# Patient Record
Sex: MALE | Race: OTHER | Employment: FULL TIME | URBAN - METROPOLITAN AREA
[De-identification: names, ages, dates, MRNs, and addresses within clinical notes are randomized per-mention and may not be internally consistent; named-entity substitution may affect disease eponyms.]

---

## 2017-04-12 ENCOUNTER — TELEPHONE (OUTPATIENT)
Dept: INTERNAL MEDICINE CLINIC | Facility: CLINIC | Age: 32
End: 2017-04-12

## 2017-04-12 NOTE — TELEPHONE ENCOUNTER
Pt is due for [facility]'S PSYCHIATRIC [employer] [employer] wellness exam anytime this calendar year. Left message to call back.

## 2017-05-09 ENCOUNTER — TELEPHONE (OUTPATIENT)
Dept: INTERNAL MEDICINE CLINIC | Facility: CLINIC | Age: 32
End: 2017-05-09

## 2017-05-09 NOTE — TELEPHONE ENCOUNTER
Pt is due for [facility] PSYCHIATRIC [employer] wellness exam anytime this calendar year. Left message to call back [phone].

## 2017-07-17 ENCOUNTER — TELEPHONE (OUTPATIENT)
Dept: FAMILY MEDICINE CLINIC | Facility: CLINIC | Age: 32
End: 2017-07-17

## 2017-07-17 NOTE — TELEPHONE ENCOUNTER
Pt is due for [facility]'S PSYCHIATRIC [place] Prec[employer] wellness exam anytime this calendar year. Left message to call back [phone].

## 2017-08-19 ENCOUNTER — OFFICE VISIT (OUTPATIENT)
Dept: FAMILY MEDICINE CLINIC | Facility: CLINIC | Age: 32
End: 2017-08-19

## 2017-08-19 ENCOUNTER — HOSPITAL ENCOUNTER (OUTPATIENT)
Dept: GENERAL RADIOLOGY | Age: 32
Discharge: HOME | End: 2017-08-19
Attending: FAMILY MEDICINE
Payer: COMMERCIAL

## 2017-08-19 VITALS
WEIGHT: 171 LBS | HEART RATE: 92 BPM | DIASTOLIC BLOOD PRESSURE: 85 MMHG | SYSTOLIC BLOOD PRESSURE: 134 MMHG | BODY MASS INDEX: 26 KG/M2

## 2017-08-19 DIAGNOSIS — M25.562 LEFT KNEE PAIN, UNSPECIFIED CHRONICITY: Primary | ICD-10-CM

## 2017-08-19 DIAGNOSIS — M25.562 LEFT KNEE PAIN, UNSPECIFIED CHRONICITY: ICD-10-CM

## 2017-08-19 PROCEDURE — 73562 X-RAY EXAM OF KNEE 3: CPT | Performed by: FAMILY MEDICINE

## 2017-08-19 PROCEDURE — 99212 OFFICE O/P EST SF 10 MIN: CPT | Performed by: FAMILY MEDICINE

## 2017-08-19 PROCEDURE — 99213 OFFICE O/P EST LOW 20 MIN: CPT | Performed by: FAMILY MEDICINE

## 2017-08-19 NOTE — PROGRESS NOTES
Blood pressure 134/85, pulse 92, weight 171 lb (77.6 kg). Patient presents today complaining of left knee pain for over a year. Reports he was kicked during a soccer game he has chronic pain there.   He reports that it does get stiff after sitting in a

## 2017-11-14 ENCOUNTER — OFFICE VISIT (OUTPATIENT)
Dept: FAMILY MEDICINE CLINIC | Facility: CLINIC | Age: 32
End: 2017-11-14

## 2017-11-14 VITALS
TEMPERATURE: 98 F | WEIGHT: 183 LBS | HEART RATE: 71 BPM | SYSTOLIC BLOOD PRESSURE: 126 MMHG | OXYGEN SATURATION: 98 % | RESPIRATION RATE: 14 BRPM | DIASTOLIC BLOOD PRESSURE: 80 MMHG | BODY MASS INDEX: 29.41 KG/M2 | HEIGHT: 66 IN

## 2017-11-14 DIAGNOSIS — J01.40 ACUTE PANSINUSITIS, RECURRENCE NOT SPECIFIED: Primary | ICD-10-CM

## 2017-11-14 PROCEDURE — 99202 OFFICE O/P NEW SF 15 MIN: CPT | Performed by: NURSE PRACTITIONER

## 2017-11-14 RX ORDER — AMOXICILLIN AND CLAVULANATE POTASSIUM 875; 125 MG/1; MG/1
1 TABLET, FILM COATED ORAL 2 TIMES DAILY
Qty: 20 TABLET | Refills: 0 | Status: SHIPPED | OUTPATIENT
Start: 2017-11-14 | End: 2017-11-24

## 2017-11-15 NOTE — PROGRESS NOTES
CHIEF COMPLAINT:   Patient presents with:  Nasal Congestion: cold symptoms for 2 weeks, now has sinus pressure. HPI:   Brenda No is a 34year old male who presents for sinus congestion for  2  weeks.  Symptoms have been worsening since onse THROAT: oral mucosa pink, moist. No visible dental caries. Posterior pharynx is not erythematous. + PND. NECK: supple, non-tender  LUNGS: clear to auscultation bilaterally, no wheezes or rhonchi, no diminished breath sounds. Breathing is non labored.   CA · Probiotics: Capsule/granule forms such as Florastor, Florajen (refrigerated), or Culturelle. Take the probiotic at least 2 -3 hours after taking the antibiotic.     Monitor symptoms and return to clinic or follow-up with PCP if not better in 2-3 days · An expectorant containing guaifenesin may help thin the mucus and promote drainage from the sinuses. · Over-the-counter decongestants may be used unless a similar medicine was prescribed.  Nasal sprays work the fastest. Use one that contains phenylephrin © 5359-0397 [employer] [address] [facility], [facility] [place]. All rights reserved. This information is not intended as a substitute for professional medical care. Always follow your healthcare professional's instructions.             The

## 2018-02-26 ENCOUNTER — TELEPHONE (OUTPATIENT)
Dept: FAMILY MEDICINE CLINIC | Facility: CLINIC | Age: 33
End: 2018-02-26

## 2018-02-26 DIAGNOSIS — M25.569 CHRONIC KNEE PAIN, UNSPECIFIED LATERALITY: Primary | ICD-10-CM

## 2018-02-26 DIAGNOSIS — G89.29 CHRONIC KNEE PAIN, UNSPECIFIED LATERALITY: Primary | ICD-10-CM

## 2018-02-26 NOTE — TELEPHONE ENCOUNTER
Pt's wife calling to request a new order/referral for PT placed on Pt's chart. PT office informed wife that on their end it needs to be issued at least 60 days prior to Pt scheduling appts and the current referral was written in August of 2017.  Please call

## 2019-02-11 ENCOUNTER — NURSE TRIAGE (OUTPATIENT)
Dept: OTHER | Age: 34
End: 2019-02-11

## 2019-02-11 NOTE — TELEPHONE ENCOUNTER
Action Requested: Summary for Provider     []  Critical Lab, Recommendations Needed  [] Need Additional Advice  []   FYI    []   Need Orders  [] Need Medications Sent to Pharmacy  []  Other     SUMMARY: Appointment made with Dr Alli Onofre on 2/12/19 at 4:5

## 2019-02-12 ENCOUNTER — OFFICE VISIT (OUTPATIENT)
Dept: FAMILY MEDICINE CLINIC | Facility: CLINIC | Age: 34
End: 2019-02-12
Payer: COMMERCIAL

## 2019-02-12 VITALS
SYSTOLIC BLOOD PRESSURE: 118 MMHG | HEIGHT: 66 IN | DIASTOLIC BLOOD PRESSURE: 69 MMHG | HEART RATE: 72 BPM | WEIGHT: 185 LBS | BODY MASS INDEX: 29.73 KG/M2

## 2019-02-12 DIAGNOSIS — J01.90 ACUTE NON-RECURRENT SINUSITIS, UNSPECIFIED LOCATION: Primary | ICD-10-CM

## 2019-02-12 PROCEDURE — 99213 OFFICE O/P EST LOW 20 MIN: CPT | Performed by: FAMILY MEDICINE

## 2019-02-12 PROCEDURE — 99212 OFFICE O/P EST SF 10 MIN: CPT | Performed by: FAMILY MEDICINE

## 2019-02-12 RX ORDER — AMOXICILLIN AND CLAVULANATE POTASSIUM 875; 125 MG/1; MG/1
1 TABLET, FILM COATED ORAL 2 TIMES DAILY
Qty: 20 TABLET | Refills: 0 | Status: SHIPPED | OUTPATIENT
Start: 2019-02-12 | End: 2019-02-15

## 2019-02-12 NOTE — PATIENT INSTRUCTIONS
Afrin nasal spray 4 days only     VAPORIZER IN BEDROOM    NYQUIL AT NIGHT     IBUPROFEN FOR HEADACHES

## 2019-02-12 NOTE — PROGRESS NOTES
Blood pressure 118/69, pulse 72, height 5' 6\" (1.676 m), weight 185 lb (83.9 kg). 3-week history coughing with yellow to green phlegm some fever some chills and a headache with facial pressure.   Clear nasal discharge coughing up yellow to green phlegm

## 2019-02-14 ENCOUNTER — NURSE TRIAGE (OUTPATIENT)
Dept: OTHER | Age: 34
End: 2019-02-14

## 2019-02-14 NOTE — TELEPHONE ENCOUNTER
Pt called to follow up on message. Advised that we are still awaiting a response from Laura Sweeney. Inquired again about symptoms. Hives are unimproved and his throat feels tight. Recommended immediate treatment in IC or ER. Benadryl po now if he has some.   He

## 2019-02-14 NOTE — TELEPHONE ENCOUNTER
Action Requested: Summary for Provider     []  Critical Lab, Recommendations Needed  [] Need Additional Advice  []   FYI    []   Need Orders  [] Need Medications Sent to Pharmacy  []  Other     SUMMARY:  [facility] PSYCHIATRIC SUPPORT [place]: please advise.     Reason for call: Hives (hiv

## 2019-02-15 ENCOUNTER — HOSPITAL ENCOUNTER (OUTPATIENT)
Age: 34
Discharge: HOME | End: 2019-02-15
Payer: COMMERCIAL

## 2019-02-15 VITALS
OXYGEN SATURATION: 100 % | TEMPERATURE: 99 F | RESPIRATION RATE: 18 BRPM | HEART RATE: 87 BPM | DIASTOLIC BLOOD PRESSURE: 79 MMHG | SYSTOLIC BLOOD PRESSURE: 130 MMHG

## 2019-02-15 DIAGNOSIS — T78.40XA ALLERGIC REACTION, INITIAL ENCOUNTER: Primary | ICD-10-CM

## 2019-02-15 PROCEDURE — 96374 THER/PROPH/DIAG INJ IV PUSH: CPT

## 2019-02-15 PROCEDURE — 96375 TX/PRO/DX INJ NEW DRUG ADDON: CPT

## 2019-02-15 PROCEDURE — S0028 INJECTION, FAMOTIDINE, 20 MG: HCPCS

## 2019-02-15 PROCEDURE — 99204 OFFICE O/P NEW MOD 45 MIN: CPT

## 2019-02-15 PROCEDURE — 99214 OFFICE O/P EST MOD 30 MIN: CPT

## 2019-02-15 RX ORDER — SODIUM CHLORIDE 9 MG/ML
1000 INJECTION, SOLUTION INTRAVENOUS ONCE
Status: COMPLETED | OUTPATIENT
Start: 2019-02-15 | End: 2019-02-15

## 2019-02-15 RX ORDER — METHYLPREDNISOLONE SODIUM SUCCINATE 125 MG/2ML
125 INJECTION, POWDER, LYOPHILIZED, FOR SOLUTION INTRAMUSCULAR; INTRAVENOUS ONCE
Status: COMPLETED | OUTPATIENT
Start: 2019-02-15 | End: 2019-02-15

## 2019-02-15 RX ORDER — FAMOTIDINE 20 MG/1
20 TABLET ORAL 2 TIMES DAILY PRN
Qty: 30 TABLET | Refills: 0 | Status: SHIPPED | OUTPATIENT
Start: 2019-02-15 | End: 2019-02-18 | Stop reason: ALTCHOICE

## 2019-02-15 RX ORDER — DIPHENHYDRAMINE HCL 25 MG
50 TABLET ORAL EVERY 6 HOURS PRN
Qty: 30 TABLET | Refills: 0 | Status: SHIPPED | OUTPATIENT
Start: 2019-02-15 | End: 2019-02-18

## 2019-02-15 RX ORDER — PREDNISONE 20 MG/1
40 TABLET ORAL DAILY
Qty: 10 TABLET | Refills: 0 | Status: SHIPPED | OUTPATIENT
Start: 2019-02-15 | End: 2019-02-18 | Stop reason: ALTCHOICE

## 2019-02-15 RX ORDER — FAMOTIDINE 10 MG/ML
20 INJECTION, SOLUTION INTRAVENOUS ONCE
Status: COMPLETED | OUTPATIENT
Start: 2019-02-15 | End: 2019-02-15

## 2019-02-15 NOTE — TELEPHONE ENCOUNTER
Patient's wife calling back stated no one called back yesterday. The patient continues to have swelling. When he woke up today his eyes, lips and tongue are swollen with itching. He denies any difficulty breathing.    The wife stated they are going

## 2019-02-15 NOTE — ED INITIAL ASSESSMENT (HPI)
Hives started Tuesday, on augmentin for sinus infection, here for throat swelling
Detail Level: Detailed
Detail Level: Generalized

## 2019-02-15 NOTE — TELEPHONE ENCOUNTER
Patient was seen in the UC on 2/14/19. Dr. Roberto Winter please advise on the antibiotic. Thank you.

## 2019-02-15 NOTE — ED PROVIDER NOTES
No chief complaint on file. HPI:     Devin Menon is a 28year old male with no significant past medical history presents with a chief complaint of  hives, shortness of breath and pruritis. Pt states that he was started on Augmentin on Tuesday.   Rosanne Saez care provider. Lungs continue to be clear bilaterally. Patient encouraged to stop taking the Augmentin. Will give prescription for prednisone, Pepcid as well as encouraged to take Benadryl every 6 hours.   Any worsening symptoms he should immediately go

## 2019-02-16 NOTE — TELEPHONE ENCOUNTER
Pt states he was seen in UC as advised, pt was given prescription for steroids, pt also taking Benadryl and Pepcid as advised. Pt states he is still itching but denies any difficulty breathing.  Advised pt call 911 or go to ER if he experiences any breathin

## 2019-02-17 ENCOUNTER — HOSPITAL ENCOUNTER (EMERGENCY)
Facility: HOSPITAL | Age: 34
Discharge: HOME | End: 2019-02-17
Attending: EMERGENCY MEDICINE
Payer: COMMERCIAL

## 2019-02-17 VITALS
HEIGHT: 68 IN | OXYGEN SATURATION: 96 % | TEMPERATURE: 97 F | BODY MASS INDEX: 28.04 KG/M2 | DIASTOLIC BLOOD PRESSURE: 82 MMHG | RESPIRATION RATE: 18 BRPM | SYSTOLIC BLOOD PRESSURE: 120 MMHG | WEIGHT: 185 LBS | HEART RATE: 75 BPM

## 2019-02-17 DIAGNOSIS — T78.40XD ALLERGIC REACTION, SUBSEQUENT ENCOUNTER: Primary | ICD-10-CM

## 2019-02-17 PROCEDURE — 99284 EMERGENCY DEPT VISIT MOD MDM: CPT

## 2019-02-17 PROCEDURE — 96374 THER/PROPH/DIAG INJ IV PUSH: CPT

## 2019-02-17 PROCEDURE — 96372 THER/PROPH/DIAG INJ SC/IM: CPT

## 2019-02-17 PROCEDURE — 96376 TX/PRO/DX INJ SAME DRUG ADON: CPT

## 2019-02-17 RX ORDER — HYDROXYZINE HYDROCHLORIDE 50 MG/ML
50 INJECTION, SOLUTION INTRAMUSCULAR ONCE
Status: COMPLETED | OUTPATIENT
Start: 2019-02-17 | End: 2019-02-17

## 2019-02-17 RX ORDER — METHYLPREDNISOLONE SODIUM SUCCINATE 125 MG/2ML
125 INJECTION, POWDER, LYOPHILIZED, FOR SOLUTION INTRAMUSCULAR; INTRAVENOUS ONCE
Status: COMPLETED | OUTPATIENT
Start: 2019-02-17 | End: 2019-02-17

## 2019-02-17 RX ORDER — METHYLPREDNISOLONE 4 MG/1
TABLET ORAL
Qty: 1 PACKAGE | Refills: 0 | Status: SHIPPED | OUTPATIENT
Start: 2019-02-17 | End: 2019-02-22

## 2019-02-17 RX ORDER — HYDROXYZINE HYDROCHLORIDE 25 MG/1
50 TABLET, FILM COATED ORAL EVERY 4 HOURS PRN
Qty: 20 TABLET | Refills: 0 | Status: SHIPPED | OUTPATIENT
Start: 2019-02-17 | End: 2019-02-18 | Stop reason: ALTCHOICE

## 2019-02-17 NOTE — ED INITIAL ASSESSMENT (HPI)
Pt states possibly having an allergic reaction to either pepcid or prednisone, reports lower lip swelling and throat feels scratchy. No respiratory distress, airway intact. Took benadryl 0800, no relief in symptoms.

## 2019-02-17 NOTE — ED PROVIDER NOTES
Patient Seen in: [facility] AND [facility] Emergency Department    History   Patient presents with:   Allergic Rxn Allergies (immune)    Stated Complaint: Allergic rxn    HPI    65-year-old male with no significant past medical history presents to the ER for eval Neck supple. No tracheal deviation present. CV: s1s2+, RRR, no m/r/g, normal distal pulses  Pulmonary/Chest: CTA b/l with no rales, wheezes. No chest wall tenderness  Abdominal: Nontender. Nondistended. Soft. Bowel sounds are normal.   Back:   :    Mu

## 2019-02-18 ENCOUNTER — OFFICE VISIT (OUTPATIENT)
Dept: FAMILY MEDICINE CLINIC | Facility: CLINIC | Age: 34
End: 2019-02-18
Payer: COMMERCIAL

## 2019-02-18 VITALS
SYSTOLIC BLOOD PRESSURE: 119 MMHG | TEMPERATURE: 98 F | HEART RATE: 74 BPM | WEIGHT: 181 LBS | BODY MASS INDEX: 28 KG/M2 | DIASTOLIC BLOOD PRESSURE: 72 MMHG

## 2019-02-18 DIAGNOSIS — J01.00 ACUTE MAXILLARY SINUSITIS, RECURRENCE NOT SPECIFIED: ICD-10-CM

## 2019-02-18 DIAGNOSIS — Z88.1 ALLERGIC REACTION TO AMOXICILLIN/CLAVULANIC ACID: Primary | ICD-10-CM

## 2019-02-18 PROCEDURE — 99212 OFFICE O/P EST SF 10 MIN: CPT | Performed by: FAMILY MEDICINE

## 2019-02-18 PROCEDURE — 99213 OFFICE O/P EST LOW 20 MIN: CPT | Performed by: FAMILY MEDICINE

## 2019-02-18 NOTE — PROGRESS NOTES
Had augmenin and the very day had rash and itching  Then took the augmentin the then next 3 doses and the itchy got worse  Then called and and had bad rash. Got prednisone and then when he went tot he house. Then Sunday had swelling in lips and tongue.

## 2022-05-20 ENCOUNTER — HOSPITAL ENCOUNTER (OUTPATIENT)
Age: 37
Discharge: HOME | End: 2022-05-20
Payer: COMMERCIAL

## 2022-05-20 VITALS
RESPIRATION RATE: 18 BRPM | TEMPERATURE: 98 F | DIASTOLIC BLOOD PRESSURE: 83 MMHG | OXYGEN SATURATION: 95 % | HEART RATE: 80 BPM | SYSTOLIC BLOOD PRESSURE: 118 MMHG

## 2022-05-20 DIAGNOSIS — L72.9 INFECTED CYST OF SKIN: Primary | ICD-10-CM

## 2022-05-20 DIAGNOSIS — L08.9 INFECTED CYST OF SKIN: Primary | ICD-10-CM

## 2022-05-20 RX ORDER — SULFAMETHOXAZOLE AND TRIMETHOPRIM 800; 160 MG/1; MG/1
1 TABLET ORAL 2 TIMES DAILY
Qty: 14 TABLET | Refills: 0 | Status: SHIPPED | OUTPATIENT
Start: 2022-05-20 | End: 2022-05-27

## 2022-05-21 NOTE — ED INITIAL ASSESSMENT (HPI)
Pt here w c/o R arm bump that is painful, itchy and throbbing x 30 min after feeling something pop. Pt states had small bump to area for months but was not painful. Denies any injuries.

## 2022-05-21 NOTE — ED PROVIDER NOTES
No chief complaint on file. HPI:     Dominguez Greenfield is a 39year old male who presents with a possible skin infection to the left posterior upper arm. Patient states he noticed a bump there for the past few weeks. He states today the bump became painful. No drainage or bleeding. There is redness. No fevers. No injury or trauma. No history of diabetes. No history of abscesses in the past.  He appears well and nontoxic. [facility] asessment screens reviewed and agree. Nursing note reviewed and I agree with documentation. Family history reviewed with patient/caregiver and is not pertinent to presenting problem. Social History    Socioeconomic History      Marital status: Single      Spouse name: Not on file      Number of children: 1      Years of education: Not on file      Highest education level: Not on file    Occupational History      Occupation: Warehouse mgr    Tobacco Use      Smoking status: Never Smoker      Smokeless tobacco: Never Used    Substance and Sexual Activity      Alcohol use: No      Drug use: No      Sexual activity: Not on file    Other Topics      Concerns:        Not on file    Social History Narrative      Not on file    Social Determinants of Health  Financial Resource Strain: Not on file  Food Insecurity: Not on file  Transportation Needs: Not on file  Physical Activity: Not on file  Stress: Not on file  Social Connections: Not on file  Intimate Partner Violence: Not on file  Housing Stability: Not on file    ROS:   Positive for stated complaint: abscess, redness, swelling, pain  All other systems reviewed and negative except as noted above. Constitutional and Vital Signs Reviewed.       Physical Exam:     GENERAL: well developed, well nourished, well hydrated, in no apparent distress  EYES: sclera clear and non icteric bilaterally  HEENT: atraumatic, normocephalic, ears and throat are clear  NECK: supple, no adenopathy  LUNGS: clear to auscultation, no RRW  CARDIO: RRR without murmur  EXTREMITIES: CMS intact. ETIENNE without difficulty. No bony tenderness RUE. SKIN: Fluctuant abscess the size of a dime to the right posterior upper arm. Redness and pain with palpation. No pus drainage or bleeding. No surrounding cellulitis. No induration. NEURO: CMS intact RUE. MDM/Assessment/Plan:     Procedure Note:  The area was cleaned. 2 cc of 1% lidocaine were used to anesthetize the area. A small incision was made with an 11 blade. A large amount of thick white pus was drained consistent with an infected sebaceous cyst.  The area was expressed and irrigated. A dressing was applied. Orders for this encounter:    Orders Placed This Encounter      sulfamethoxazole-trimethoprim -160 MG Oral Tab per tablet          Sig: Take 1 tablet by mouth 2 (two) times daily for 7 days. Dispense:  14 tablet          Refill:  0      Labs performed this visit:  No results found for this or any previous visit (from the past 10 hour(s)). MDM:  Oh placed the patient on Bactrim, recommend warm compresses, and recommend he follow-up with his doctor for wound check in the next couple days. Diagnosis:    ICD-10-CM    1. Infected cyst of skin  L72.9     L08.9        All results reviewed and discussed with patient. See AVS for detailed discharge instructions for your condition today.     Follow Up with:  Sherwin Rodriguez DO  [address] [address]  [phone]    Schedule an appointment as soon as possible for a visit in 3 days

## 2025-05-12 ENCOUNTER — TELEPHONE (OUTPATIENT)
Dept: OTOLARYNGOLOGY | Facility: CLINIC | Age: 40
End: 2025-05-12

## 2025-05-12 NOTE — ED INITIAL ASSESSMENT (HPI)
Patient reports sore throat for the last 10 days with worsening symptoms overnight.  + painful swallow, tactile fevers.

## 2025-05-12 NOTE — TELEPHONE ENCOUNTER
Per Dr. Ponce to add patient tomorrow. Pt added at 0800am at . Pt will call back regarding scheduling information, please route callback to RN.

## 2025-05-12 NOTE — ED PROVIDER NOTES
Patient Seen in: [facility]      History     Chief Complaint   Patient presents with    Sore Throat     Stated Complaint: Sore Throat    Subjective:   HPI    39 year male presents to the  with c/o sore throat x 10 days, worse x3 days.  He reports painful swallowing and worsening swelling to the right side now with swelling of the right cheek.  History of Present Illness               Objective:     History reviewed. No pertinent past medical history.           Past Surgical History:   Procedure Laterality Date    Ligation of hemorrhoid(s)  9/16/14    Other surgical history      NECK SURGERY                No pertinent social history.            Review of Systems    Positive for stated complaint: Sore Throat  Other systems are as noted in HPI.  Constitutional and vital signs reviewed.      All other systems reviewed and negative except as noted above.                  Physical Exam     ED Triage Vitals [05/12/25 0924]   /78   Pulse 68   Resp 16   Temp 98.4 °F (36.9 °C)   Temp src Oral   SpO2 99 %   O2 Device None (Room air)       Current Vitals:   Vital Signs  BP: 136/78  Pulse: 68  Resp: 16  Temp: 98.4 °F (36.9 °C)  Temp src: Oral    Oxygen Therapy  SpO2: 99 %  O2 Device: None (Room air)        Physical Exam  Vitals and nursing note reviewed.   Constitutional:       General: He is not in acute distress.     Appearance: He is well-developed. He is ill-appearing. He is not toxic-appearing.   HENT:      Head:      Jaw: Trismus and pain on movement present. No malocclusion.      Right Ear: Tympanic membrane normal.      Left Ear: Tympanic membrane normal.      Mouth/Throat:      Pharynx: Posterior oropharyngeal erythema present.      Tonsils: Tonsillar abscess present. 3+ on the right. 1+ on the left.   Cardiovascular:      Rate and Rhythm: Normal rate and regular rhythm.   Pulmonary:      Effort: Pulmonary effort is normal.   Neurological:      Mental Status: He is alert.           Physical Exam                 ED Course     Labs Reviewed   POCT CBC - Abnormal; Notable for the following components:       Result Value    # Neutrophil 8.5 (*)     All other components within normal limits   RAPID STREP A - Abnormal; Notable for the following components:    Strep A by PCR Positive (*)     All other components within normal limits   POCT ISTAT CHEM8 CARTRIDGE - Normal          Results                           MDM      Viral sore throat, strep, cellulitis considered abscess retropharyngeal abscess  Tonsillitis with enhancing +R PTA  Basic lab work unremarkable  IV clinda IV decadron fluids patient feeling significantly better  Consult Rafal oPnce ENT-will follow-up with the patient in the morning in the [place] office          Medical Decision Making  Problems Addressed:  Acute bacterial tonsillitis: acute illness or injury  Peritonsillar abscess: acute illness or injury    Amount and/or Complexity of Data Reviewed  Labs: ordered. Decision-making details documented in ED Course.  Radiology: ordered. Decision-making details documented in ED Course.  Discussion of management or test interpretation with external provider(s): ENT- Dr Ponce    Risk  OTC drugs.  Prescription drug management.        Disposition and Plan     Clinical Impression:  1. Acute bacterial tonsillitis    2. Peritonsillar abscess         Disposition:  Discharge  5/12/2025 12:08 pm    Follow-up:  Rafal Ponce,   [address] [facility] [address] [phone]    Go to   9am          Medications Prescribed:  Current Discharge Medication List        START taking these medications    Details   predniSONE 20 MG Oral Tab Take 2 tablets (40 mg total) by mouth daily for 3 days.  Qty: 6 tablet, Refills: 0      clindamycin 300 MG Oral Cap Take 1 capsule (300 mg total) by mouth 3 (three) times daily for 10 days.  Qty: 30 capsule, Refills: 0             Supplementary Documentation:

## 2025-05-12 NOTE — DISCHARGE INSTRUCTIONS
Push fluids  Tylenol/ibuprofen as needed for pain and fever  Follow-up with ENT tomorrow morning-Dr. Ponce-9 AM the office will be contacting you or call the office in the morning if needed return for concerns

## 2025-05-13 NOTE — PROGRESS NOTES
[place]  OTOLARYNGOLOGY - HEAD & NECK SURGERY    5/13/2025     Reason for Consultation:     Chief Complaint   Patient presents with    New Patient    Throat Problem     Patient here for sore throat f/up after ER on 05/12/2025, throat abscess.          History of Present Illness:     History of Present Illness  Claudy Lay is a 39 year old male who presents with a painful sore throat and swelling of the right tonsil. He was referred by a Medicare facility for further evaluation and treatment.    Approximately three to four weeks ago, he developed a sore throat which he initially managed with ibuprofen. The pain persisted and intensified over the last two weeks, becoming significantly painful a few days ago. He experiences associated pain in the jaw, teeth, and ear. He noticed significant swelling of the right tonsil.    He sought care at a Medicare facility where he received intravenous treatment. A CT scan performed at the facility showed an abscess collection in the right peritonsillar space    He reports improvement in symptoms since yesterday but continues to experience pain and difficulty opening his jaw.    He has a known allergy to penicillin, discovered approximately eight years ago when he developed a rash and throat swelling after taking Augmentin.    Past Medical History  Past Medical History[1]    Past Surgical History  Past Surgical History[2]    Family History  Family History[3]    Social History  Pediatric History   Patient Parents    Not on file     Other Topics Concern    Not on file   Social History Narrative    Not on file           Current Medications:  Current Medications[4]    Allergies  Allergies[5]    Review of Systems:   A comprehensive 10 point review of systems was completed.  Pertinent positives and negatives noted in the the HPI.    Physical Exam:   Height 5' 8\" (1.727 m), weight 179 lb 12.8 oz (81.6 kg).    GENERAL: No acute distress, Comfortable appearing  FACE: HB  1/6, Normal Animation  HEAD: Normocephalic  EYES: EOMI, pupils equil  EARS: Bilateral Auricles Symmetric   NOSE: Nares patent bilaterally  ORAL CAVITY: Tongue mobile, Oropharynx clear, with fullness of the right soft palate, slight medialization of the right tonsil    Procedure: Peritonsillar abscess drainage  After obtaining informed verbal consent the patient was anesthetized using topical 4% lidocaine.  Additional anesthetic was placed with 1% lidocaine with 1-100,000 epinephrine injection into the right soft palate.  After adequate time an 18-gauge needle was used to aspirate 2 cc of purulent fluid from the right peritonsillar space.  Patient tolerated this well and hemostasis was excellent after the drainage.    Results:     Laboratory Data:  Lab Results   Component Value Date    WBC 6.2 09/06/2014    HGB 11.6 (L) 09/06/2014    HCT 36.2 (L) 09/06/2014     09/06/2014    GLU 85 06/14/2015    AST 19 09/06/2014    ALT 19 09/06/2014         Imaging:  CT SOFT TISSUE OF NECK(CONTRAST ONLY) (CPT=70491)  Result Date: 5/12/2025  PROCEDURE: CT SOFT TISSUE OF NECK (CONTRAST ONLY) (CPT=70491)  COMPARISON: None.  INDICATIONS: Sore Throat, positive for strep throat  TECHNIQUE: Multidetector CT images were obtained through the neck soft tissues following the infusion of non-ionic intravenous contrast material. Automated exposure control for dose reduction was used. Adjustment of the mA and/or kV was done based on the  patient's size. Iterative reconstruction technique for dose reduction was employed. Multiplanar reformats were created.   FINDINGS:  NASO/OROPHARYNX: Enlargement of the right greater than left palatine tonsils with associated striated enhancement and bilateral palatine tonsilliths.  At the periphery of the right palatine tonsillar pillar, there is a 1.9 x 1.4 x 2.0 cm peripherally enhancing collection, which is concerning for intra tonsillar/peritonsillar abscess.  Tonsillar enlargement results in mild  narrowing of the oropharyngeal airway.  Additional mild likely reactive adenoid and lingual tonsillar enlargement.  Asymmetric effacement of the right parapharyngeal fat plane. ORAL CAVITY/TONGUE: No visible mass or significant asymmetry.  COMPARTMENTS: The , parotid, carotid, retropharyngeal, and perivertebral spaces are without focal attenuation abnormality. The parapharyngeal fat planes are bilaterally symmetric without effacement. HYPOPHARYNX: No mass or other visible lesion.  LARYNX: No apparent vocal cord mass or asymmetry. NECK GLANDS: The parotid, submandibular, and thyroid glands are unremarkable.  LYMPH NODES: Conspicuously enhancing but nonenlarged right greater than left cervical lymph nodes. VASCULATURE: Limited views are unremarkable.  SINUSES/MASTOIDS: Minor scattered paranasal sinus mucosal thickening with right greater than left maxillary sinus retention cysts. MEDIASTINUM: Limited views are unremarkable.  BONES: No significant osseous lesions.  OTHER: Visualized portions of the contrast-enhanced brain are grossly unremarkable.         CONCLUSION:  1. Enlargement of the right greater than left palatine tonsils with associated striated enhancement; findings are compatible with acute tonsillitis.  At the periphery of the right palatine tonsil, there is a 1.9 x 1.4 x 2.0 cm peripherally enhancing collection, which is concerning for intratonsillar/peritonsillar abscess.  Tonsillar enlargement results in mild narrowing of the oropharyngeal airway. 2. Conspicuously enhancing but nonenlarged right greater than left cervical lymph nodes, which are likely reactive. 3. Right greater left maxillary sinus retention cysts. 4. Lesser incidental findings as above.   elm-remote  Dictated by (CST): Franco Sandra MD on 5/12/2025 at 11:26 AM     Finalized by (CST): Franco Sandra MD on 5/12/2025 at 11:31 AM            Impression:       ICD-10-CM    1. Peritonsillar abscess  J36       2. Throat pain  R07.0        3. Right ear pain  H92.01           Recommendations:     Peritonsillar abscess  Right-sided peritonsillar abscess with associated jaw, teeth, and ear pain. Symptoms began three to four weeks ago with a sore throat, worsening over the last two weeks. CT scan confirmed abscess collection. Persistent trismus indicates fluid presence. Abscess drainage performed to expedite healing and prevent worsening. Referred ear and throat pain is normal due to anatomical location. Improvement expected with current antibiotic regimen. Abscess drainage is a common procedure, performed several times a week, and is expected to significantly reduce symptoms.  - Performed abscess drainage to evacuate pus and reduce symptoms.  - Continue current antibiotic regimen with clindamycin to address remaining infection.  - Schedule follow-up appointment on Friday to assess recovery progress.  - Instruct to report any worsening symptoms in the next few days.    Allergy to penicillin  Allergy to penicillin with throat closure and rash after Augmentin administration approximately eight years ago.       Thank you for allowing me to participate in the care of your patient.    Rafal Ponce,    Otolaryngology/Rhinology, Sinus, and Endoscopic Skull Base Surgery  [address] [place] [address]  Phone [phone]  Fax [phone]  5/13/2025  8:26 AM  5/13/2025          [1] History reviewed. No pertinent past medical history.  [2]   Past Surgical History:  Procedure Laterality Date    Ligation of hemorrhoid(s)  9/16/14    Other surgical history      NECK SURGERY   [3] History reviewed. No pertinent family history.  [4]   Current Outpatient Medications   Medication Sig Dispense Refill    predniSONE 20 MG Oral Tab Take 2 tablets (40 mg total) by mouth daily for 3 days. 6 tablet 0    clindamycin 300 MG Oral Cap Take 1 capsule (300 mg total) by mouth 3 (three) times daily for 10 days. 30 capsule  0   [5]   Allergies  Allergen Reactions    Augmentin [Amoxicillin-Pot Clavulanate] ANGIOEDEMA

## 2025-05-13 NOTE — PROGRESS NOTES
The following individual(s) verbally consented to be recorded using ambient AI listening technology and understand that they can each withdraw their consent to this listening technology at any point by asking the clinician to turn off or pause the recording:  Yes to consent  Patient name: Claudy Lay

## 2025-05-16 NOTE — PROGRESS NOTES
[place]  OTOLARYNGOLOGY - HEAD & NECK SURGERY    5/16/2025     Reason for Consultation:   Sore throat    History of Present Illness:     History of Present Illness  Claudy Lay is a 39 year old male who presents with a painful sore throat and swelling of the right tonsil. He was referred by a Medicare facility for further evaluation and treatment.    Approximately three to four weeks ago, he developed a sore throat which he initially managed with ibuprofen. The pain persisted and intensified over the last two weeks, becoming significantly painful a few days ago. He experiences associated pain in the jaw, teeth, and ear. He noticed significant swelling of the right tonsil.    He sought care at a Medicare facility where he received intravenous treatment. A CT scan performed at the facility showed an abscess collection in the right peritonsillar space    He reports improvement in symptoms since yesterday but continues to experience pain and difficulty opening his jaw.    He has a known allergy to penicillin, discovered approximately eight years ago when he developed a rash and throat swelling after taking Augmentin.    5/16/2025: Patient returns today to follow-up on his throat.  States that he is feeling much improved, has not had any tightness of his jaw, he is able to eat well, and overall pain is reduced.  He has been continuing his clindamycin.      Past Medical History  Past Medical History[1]    Past Surgical History  Past Surgical History[2]    Family History  Family History[3]    Social History  Pediatric History   Patient Parents    Not on file     Other Topics Concern    Not on file   Social History Narrative    Not on file           Current Medications:  Current Medications[4]    Allergies  Allergies[5]    Review of Systems:   A comprehensive 10 point review of systems was completed.  Pertinent positives and negatives noted in the the HPI.    Physical Exam:   There were no vitals taken for  this visit.    GENERAL: No acute distress, Comfortable appearing  FACE: HB 1/6, Normal Animation  HEAD: Normocephalic  EYES: EOMI, pupils equil  EARS: Bilateral Auricles Symmetric   NOSE: Nares patent bilaterally  ORAL CAVITY: Tongue mobile, Oropharynx clear, with minimal fullness of the right tonsil, no further uvular deviation    Procedure: Peritonsillar abscess drainage -as performed on initial consultation  After obtaining informed verbal consent the patient was anesthetized using topical 4% lidocaine.  Additional anesthetic was placed with 1% lidocaine with 1-100,000 epinephrine injection into the right soft palate.  After adequate time an 18-gauge needle was used to aspirate 2 cc of purulent fluid from the right peritonsillar space.  Patient tolerated this well and hemostasis was excellent after the drainage.    Results:     Laboratory Data:  Lab Results   Component Value Date    WBC 6.2 09/06/2014    HGB 11.6 (L) 09/06/2014    HCT 36.2 (L) 09/06/2014     09/06/2014    GLU 85 06/14/2015    AST 19 09/06/2014    ALT 19 09/06/2014         Imaging:  CT SOFT TISSUE OF NECK(CONTRAST ONLY) (CPT=70491)  Result Date: 5/12/2025  PROCEDURE: CT SOFT TISSUE OF NECK (CONTRAST ONLY) (CPT=70491)  COMPARISON: None.  INDICATIONS: Sore Throat, positive for strep throat  TECHNIQUE: Multidetector CT images were obtained through the neck soft tissues following the infusion of non-ionic intravenous contrast material. Automated exposure control for dose reduction was used. Adjustment of the mA and/or kV was done based on the  patient's size. Iterative reconstruction technique for dose reduction was employed. Multiplanar reformats were created.   FINDINGS:  NASO/OROPHARYNX: Enlargement of the right greater than left palatine tonsils with associated striated enhancement and bilateral palatine tonsilliths.  At the periphery of the right palatine tonsillar pillar, there is a 1.9 x 1.4 x 2.0 cm peripherally enhancing collection,  which is concerning for intra tonsillar/peritonsillar abscess.  Tonsillar enlargement results in mild narrowing of the oropharyngeal airway.  Additional mild likely reactive adenoid and lingual tonsillar enlargement.  Asymmetric effacement of the right parapharyngeal fat plane. ORAL CAVITY/TONGUE: No visible mass or significant asymmetry.  COMPARTMENTS: The , parotid, carotid, retropharyngeal, and perivertebral spaces are without focal attenuation abnormality. The parapharyngeal fat planes are bilaterally symmetric without effacement. HYPOPHARYNX: No mass or other visible lesion.  LARYNX: No apparent vocal cord mass or asymmetry. NECK GLANDS: The parotid, submandibular, and thyroid glands are unremarkable.  LYMPH NODES: Conspicuously enhancing but nonenlarged right greater than left cervical lymph nodes. VASCULATURE: Limited views are unremarkable.  SINUSES/MASTOIDS: Minor scattered paranasal sinus mucosal thickening with right greater than left maxillary sinus retention cysts. MEDIASTINUM: Limited views are unremarkable.  BONES: No significant osseous lesions.  OTHER: Visualized portions of the contrast-enhanced brain are grossly unremarkable.         CONCLUSION:  1. Enlargement of the right greater than left palatine tonsils with associated striated enhancement; findings are compatible with acute tonsillitis.  At the periphery of the right palatine tonsil, there is a 1.9 x 1.4 x 2.0 cm peripherally enhancing collection, which is concerning for intratonsillar/peritonsillar abscess.  Tonsillar enlargement results in mild narrowing of the oropharyngeal airway. 2. Conspicuously enhancing but nonenlarged right greater than left cervical lymph nodes, which are likely reactive. 3. Right greater left maxillary sinus retention cysts. 4. Lesser incidental findings as above.   elm-remote  Dictated by (CST): Franco Sandra MD on 5/12/2025 at 11:26 AM     Finalized by (CST): Franco Sandra MD on 5/12/2025 at 11:31  AM            Impression:       ICD-10-CM    1. Peritonsillar abscess  J36       2. Throat pain  R07.0       3. Right ear pain  H92.01           Recommendations:     Peritonsillar abscess  Patient appears to be significantly improved as compared to a few days ago.  Has had much less trismus, is able to eat and drink without an issue, pain is well-controlled.  He will return to see me if he has any recurrent issues.    Allergy to penicillin  Allergy to penicillin with throat closure and rash after Augmentin administration approximately eight years ago.       Thank you for allowing me to participate in the care of your patient.    Rafal Ponce,    Otolaryngology/Rhinology, Sinus, and Endoscopic Skull Base Surgery  [address] [place] [id number]  Phone [phone]  Fax [phone]  5/13/2025  8:26 AM  5/16/2025          [1] No past medical history on file.  [2]   Past Surgical History:  Procedure Laterality Date    Ligation of hemorrhoid(s)  9/16/14    Other surgical history      NECK SURGERY   [3] No family history on file.  [4]   Current Outpatient Medications   Medication Sig Dispense Refill    clindamycin 300 MG Oral Cap Take 1 capsule (300 mg total) by mouth 3 (three) times daily for 10 days. 30 capsule 0   [5]   Allergies  Allergen Reactions    Augmentin [Amoxicillin-Pot Clavulanate] ANGIOEDEMA

## (undated) NOTE — ED AVS SNAPSHOT
Geetha Lira   MRN: [id number]    Department:  [facility] Emergency Department   Date of Visit:  2/17/2019           Disclosure     Insurance plans vary and the physician(s) referred by the ER may not be covered by your plan.  Pl CARE PHYSICIAN AT ONCE OR RETURN IMMEDIATELY TO THE EMERGENCY DEPARTMENT. If you have been prescribed any medication(s), please fill your prescription right away and begin taking the medication(s) as directed.   If you believe that any of the medications